# Patient Record
Sex: MALE | Race: WHITE | HISPANIC OR LATINO
[De-identification: names, ages, dates, MRNs, and addresses within clinical notes are randomized per-mention and may not be internally consistent; named-entity substitution may affect disease eponyms.]

---

## 2018-02-05 PROBLEM — Z00.00 ENCOUNTER FOR PREVENTIVE HEALTH EXAMINATION: Status: ACTIVE | Noted: 2018-02-05

## 2018-02-19 ENCOUNTER — NON-APPOINTMENT (OUTPATIENT)
Age: 43
End: 2018-02-19

## 2018-02-19 ENCOUNTER — APPOINTMENT (OUTPATIENT)
Dept: CARDIOLOGY | Facility: CLINIC | Age: 43
End: 2018-02-19
Payer: MEDICAID

## 2018-02-19 VITALS
DIASTOLIC BLOOD PRESSURE: 70 MMHG | HEIGHT: 66 IN | SYSTOLIC BLOOD PRESSURE: 112 MMHG | HEART RATE: 84 BPM | WEIGHT: 190 LBS | BODY MASS INDEX: 30.53 KG/M2

## 2018-02-19 DIAGNOSIS — Z80.9 FAMILY HISTORY OF MALIGNANT NEOPLASM, UNSPECIFIED: ICD-10-CM

## 2018-02-19 DIAGNOSIS — Z82.5 FAMILY HISTORY OF ASTHMA AND OTHER CHRONIC LOWER RESPIRATORY DISEASES: ICD-10-CM

## 2018-02-19 DIAGNOSIS — Z78.9 OTHER SPECIFIED HEALTH STATUS: ICD-10-CM

## 2018-02-19 PROCEDURE — 99205 OFFICE O/P NEW HI 60 MIN: CPT

## 2018-03-07 ENCOUNTER — APPOINTMENT (OUTPATIENT)
Dept: CARDIOLOGY | Facility: CLINIC | Age: 43
End: 2018-03-07
Payer: MEDICAID

## 2018-03-07 PROCEDURE — 93015 CV STRESS TEST SUPVJ I&R: CPT

## 2018-03-07 PROCEDURE — 93306 TTE W/DOPPLER COMPLETE: CPT

## 2018-03-14 ENCOUNTER — RECORD ABSTRACTING (OUTPATIENT)
Age: 43
End: 2018-03-14

## 2018-03-15 ENCOUNTER — APPOINTMENT (OUTPATIENT)
Dept: CARDIOLOGY | Facility: CLINIC | Age: 43
End: 2018-03-15
Payer: MEDICAID

## 2018-03-15 VITALS
HEIGHT: 66 IN | OXYGEN SATURATION: 98 % | RESPIRATION RATE: 16 BRPM | WEIGHT: 196 LBS | BODY MASS INDEX: 31.5 KG/M2 | HEART RATE: 80 BPM | SYSTOLIC BLOOD PRESSURE: 104 MMHG | DIASTOLIC BLOOD PRESSURE: 62 MMHG

## 2018-03-15 PROCEDURE — 99214 OFFICE O/P EST MOD 30 MIN: CPT

## 2018-06-06 ENCOUNTER — EMERGENCY (EMERGENCY)
Facility: HOSPITAL | Age: 43
LOS: 1 days | End: 2018-06-06
Payer: COMMERCIAL

## 2018-06-06 PROCEDURE — 99053 MED SERV 10PM-8AM 24 HR FAC: CPT

## 2018-06-06 PROCEDURE — 99284 EMERGENCY DEPT VISIT MOD MDM: CPT | Mod: 25

## 2018-09-20 ENCOUNTER — APPOINTMENT (OUTPATIENT)
Dept: CARDIOLOGY | Facility: CLINIC | Age: 43
End: 2018-09-20
Payer: MEDICAID

## 2018-09-20 VITALS
DIASTOLIC BLOOD PRESSURE: 60 MMHG | HEIGHT: 66 IN | SYSTOLIC BLOOD PRESSURE: 96 MMHG | BODY MASS INDEX: 30.05 KG/M2 | HEART RATE: 84 BPM | WEIGHT: 187 LBS

## 2018-09-20 PROCEDURE — 99213 OFFICE O/P EST LOW 20 MIN: CPT

## 2019-01-21 ENCOUNTER — APPOINTMENT (OUTPATIENT)
Dept: CARDIOLOGY | Facility: CLINIC | Age: 44
End: 2019-01-21

## 2019-03-15 ENCOUNTER — APPOINTMENT (OUTPATIENT)
Dept: CARDIOLOGY | Facility: CLINIC | Age: 44
End: 2019-03-15
Payer: MEDICAID

## 2019-03-15 ENCOUNTER — NON-APPOINTMENT (OUTPATIENT)
Age: 44
End: 2019-03-15

## 2019-03-15 VITALS
OXYGEN SATURATION: 97 % | SYSTOLIC BLOOD PRESSURE: 106 MMHG | DIASTOLIC BLOOD PRESSURE: 74 MMHG | BODY MASS INDEX: 29.05 KG/M2 | WEIGHT: 180 LBS | HEART RATE: 78 BPM

## 2019-03-15 PROCEDURE — 99214 OFFICE O/P EST MOD 30 MIN: CPT

## 2019-03-15 PROCEDURE — 93000 ELECTROCARDIOGRAM COMPLETE: CPT

## 2019-03-15 NOTE — ASSESSMENT
[FreeTextEntry1] : Past tests for reference:\par \par - EKG 02/19/18: Sinus rhythm. Small Q waves in inferior leads. Within normal limits\par - Echocardiogram March 2018: Normal LV function and wall motion. EF 60%.\par - Exercise stress test: March 2018: Exercise 9:57 minutes of [facility] protocol. Negative for ischemia by EKG. No chest pain or angina.

## 2019-03-15 NOTE — REVIEW OF SYSTEMS
[see HPI] : see HPI [Negative] : Heme/Lymph [Palpitations] : palpitations [Chest Pain] : no chest pain

## 2019-03-15 NOTE — DISCUSSION/SUMMARY
[FreeTextEntry1] : ELISSA BENSON is a 43 year old M who presents today Mar 15, 2019 with the above history and the following active issues: \par \par - Abnl EKG. No acute changes on initial EKG. Today's EKG remains unchanged. Echocardiogram and stress test were reviewed. Normal LVEF and no evidence of ischemia by EKG during stress testing. No recurrence of ACP. Active at baseline. Advised him to call our office for any change in symptoms. Otherwise he should focus on risk factor modification. Reviewed the need for low salt, low chol diet as well as weight reduction and regular cardiovascular activity. \par \par - We have requested the results of recent labs from PCP. Consider lipid panel and A1c for risk stratification. \par \par F/U with our office PRN.\par Reviewed red flag symptoms which would warrant urgent reevaluation.\par Any questions and concerns were addressed and resolved. \par \par Sincerely,\par \par MERRITT Serra\par Patients history, testing, and plan reviewed with supervising MD: Dr. Olga Britt

## 2019-03-15 NOTE — PHYSICAL EXAM
[General Appearance - Well Developed] : well developed [Normal Appearance] : normal appearance [Well Groomed] : well groomed [General Appearance - Well Nourished] : well nourished [No Deformities] : no deformities [General Appearance - In No Acute Distress] : no acute distress [Normal Conjunctiva] : the conjunctiva exhibited no abnormalities [Eyelids - No Xanthelasma] : the eyelids demonstrated no xanthelasmas [No Oral Pallor] : no oral pallor [No Oral Cyanosis] : no oral cyanosis [Respiration, Rhythm And Depth] : normal respiratory rhythm and effort [Exaggerated Use Of Accessory Muscles For Inspiration] : no accessory muscle use [Auscultation Breath Sounds / Voice Sounds] : lungs were clear to auscultation bilaterally [Heart Rate And Rhythm] : heart rate and rhythm were normal [Heart Sounds] : normal S1 and S2 [Murmurs] : no murmurs present [Abdomen Soft] : soft [Abdomen Tenderness] : non-tender [Abnormal Walk] : normal gait [Gait - Sufficient For Exercise Testing] : the gait was sufficient for exercise testing [Skin Color & Pigmentation] : normal skin color and pigmentation [Oriented To Time, Place, And Person] : oriented to person, place, and time [Affect] : the affect was normal [Mood] : the mood was normal [No Anxiety] : not feeling anxious [] : no ischemic changes [FreeTextEntry1] : No Carotid bruit. No JVD

## 2019-04-04 ENCOUNTER — EMERGENCY (EMERGENCY)
Facility: HOSPITAL | Age: 44
LOS: 1 days | End: 2019-04-04
Admitting: EMERGENCY MEDICINE
Payer: OTHER MISCELLANEOUS

## 2019-04-04 PROCEDURE — 73562 X-RAY EXAM OF KNEE 3: CPT | Mod: 26,LT

## 2019-04-04 PROCEDURE — 99283 EMERGENCY DEPT VISIT LOW MDM: CPT

## 2019-04-04 PROCEDURE — 73590 X-RAY EXAM OF LOWER LEG: CPT | Mod: 26,LT

## 2019-10-29 NOTE — HISTORY OF PRESENT ILLNESS
[FreeTextEntry1] : Joshua is a pleasant 43-year-old male with no significant past cardiovascular history. He does not have  hypertension or diabetes. He does not know his lipid profile. \par \par He was noted to have abnormal EKG and  patient had complains of left-sided chest discomfort during his initial evaluation about one year ago. He is fairly active. He works as a  or a . He does not have shortness of breath on exertion. He had echocardiogram and stress testing in March 2018 which were unremarkable.\par \par There is no prior history of MI, AF, CHF, or CVA. \par \par Since last seen there has been no illness or hospitalization. He remains active. No recurrence of chest pain or discomfort. He does note about 3 months ago he felt palpitations while he was working heavily. These resolved on their own and also have not recurred in months. Denies dizziness, lightheadedness or syncope. No orthopnea or edema. Stable weight.\par \par EKG today 3/15/19 ordered and interpreted by me reveals normal sinus rhythm with non-dx Qwaves inferiorly and early repolarization. There are no changes compared to prior. \par \par  no concerns

## 2020-03-19 ENCOUNTER — APPOINTMENT (OUTPATIENT)
Dept: CARDIOLOGY | Facility: CLINIC | Age: 45
End: 2020-03-19

## 2020-09-08 ENCOUNTER — EMERGENCY (EMERGENCY)
Facility: HOSPITAL | Age: 45
LOS: 1 days | End: 2020-09-08
Admitting: EMERGENCY MEDICINE
Payer: COMMERCIAL

## 2020-09-08 PROCEDURE — 99283 EMERGENCY DEPT VISIT LOW MDM: CPT

## 2021-04-29 ENCOUNTER — APPOINTMENT (OUTPATIENT)
Age: 46
End: 2021-04-29

## 2021-05-27 ENCOUNTER — APPOINTMENT (OUTPATIENT)
Age: 46
End: 2021-05-27

## 2021-11-18 ENCOUNTER — NON-APPOINTMENT (OUTPATIENT)
Age: 46
End: 2021-11-18

## 2021-11-18 ENCOUNTER — APPOINTMENT (OUTPATIENT)
Dept: CARDIOLOGY | Facility: CLINIC | Age: 46
End: 2021-11-18
Payer: MEDICAID

## 2021-11-18 VITALS
BODY MASS INDEX: 30.86 KG/M2 | HEIGHT: 66 IN | DIASTOLIC BLOOD PRESSURE: 78 MMHG | WEIGHT: 192 LBS | TEMPERATURE: 97.5 F | HEART RATE: 73 BPM | OXYGEN SATURATION: 98 % | SYSTOLIC BLOOD PRESSURE: 100 MMHG

## 2021-11-18 DIAGNOSIS — R00.2 PALPITATIONS: ICD-10-CM

## 2021-11-18 PROCEDURE — 93000 ELECTROCARDIOGRAM COMPLETE: CPT

## 2021-11-18 PROCEDURE — 99213 OFFICE O/P EST LOW 20 MIN: CPT

## 2021-11-18 NOTE — HISTORY OF PRESENT ILLNESS
[FreeTextEntry1] : Joshua is a pleasant 45-year-old male with no significant past cardiovascular history. He does not have  hypertension or diabetes. He does not know his lipid profile. \par \par He was noted to have abnormal EKG and  patient had history of left-sided chest discomfort during his initial evaluation about one year ago - which have resolved. He is fairly active. He works as a  or a . He does not have shortness of breath on exertion. He had echocardiogram and stress testing in March 2018 which were unremarkable.\par \par There is no prior history of MI, AF, CHF, or CVA.  Currently he does not take any medications.  He does not have hypertension\par \par Since last seen there has been no illness or hospitalization. He remains active. No recurrence of chest pain or discomfort. He does note about 3 months ago he felt palpitations while he was working heavily. These resolved on their own and also have not recurred in months. Denies dizziness, lightheadedness or syncope. No orthopnea or edema. Stable weight.\par \par

## 2021-11-18 NOTE — DISCUSSION/SUMMARY
[FreeTextEntry1] : ELISSA BENSON is a 45 year old M \par \par - Today's EKG is sinus rhythm, unremarkable.  Previous echocardiogram and stress test were reviewed. Normal LVEF and no evidence of ischemia by EKG during stress testing. No recurrence of ACP. Active at baseline. Advised him to call our office for any change in symptoms. Otherwise he should focus on risk factor modification. Reviewed the need for low salt, low chol diet as well as weight reduction and regular cardiovascular activity. \par \par We have requested the results of recent labs from PCP.  He was told that his lipids were acceptable\par \par F/U with our office in 1 year: Earlier if needed; Reviewed red flag symptoms which would warrant urgent reevaluation. Any questions and concerns were addressed and resolved. \par \par \par Thank you for this referral and allowing me to participate in the care of this patient.  If I can be of any further help or  if you have any questions, please do not hesitate to contact me\par \par \par Sincerely,\par \par Dereck Mccormick MD, FACC, MICHELLE

## 2021-11-18 NOTE — PHYSICAL EXAM
[General Appearance - Well Developed] : well developed [Normal Appearance] : normal appearance [Well Groomed] : well groomed [General Appearance - Well Nourished] : well nourished [No Deformities] : no deformities [General Appearance - In No Acute Distress] : no acute distress [Normal Conjunctiva] : the conjunctiva exhibited no abnormalities [Eyelids - No Xanthelasma] : the eyelids demonstrated no xanthelasmas [No Oral Pallor] : no oral pallor [No Oral Cyanosis] : no oral cyanosis [FreeTextEntry1] : No Carotid bruit. No JVD [Respiration, Rhythm And Depth] : normal respiratory rhythm and effort [Exaggerated Use Of Accessory Muscles For Inspiration] : no accessory muscle use [Auscultation Breath Sounds / Voice Sounds] : lungs were clear to auscultation bilaterally [Heart Sounds] : normal S1 and S2 [Heart Rate And Rhythm] : heart rate and rhythm were normal [Murmurs] : no murmurs present [Abdomen Soft] : soft [Abdomen Tenderness] : non-tender [Abnormal Walk] : normal gait [Gait - Sufficient For Exercise Testing] : the gait was sufficient for exercise testing [Skin Color & Pigmentation] : normal skin color and pigmentation [] : no rash [Oriented To Time, Place, And Person] : oriented to person, place, and time [Affect] : the affect was normal [Mood] : the mood was normal [No Anxiety] : not feeling anxious

## 2023-12-05 ENCOUNTER — APPOINTMENT (OUTPATIENT)
Dept: CARDIOLOGY | Facility: CLINIC | Age: 48
End: 2023-12-05
Payer: MEDICAID

## 2023-12-05 ENCOUNTER — NON-APPOINTMENT (OUTPATIENT)
Age: 48
End: 2023-12-05

## 2023-12-05 VITALS
OXYGEN SATURATION: 98 % | DIASTOLIC BLOOD PRESSURE: 74 MMHG | SYSTOLIC BLOOD PRESSURE: 112 MMHG | BODY MASS INDEX: 32.3 KG/M2 | RESPIRATION RATE: 14 BRPM | HEART RATE: 85 BPM | HEIGHT: 66 IN | WEIGHT: 201 LBS

## 2023-12-05 DIAGNOSIS — E78.00 PURE HYPERCHOLESTEROLEMIA, UNSPECIFIED: ICD-10-CM

## 2023-12-05 DIAGNOSIS — R94.31 ABNORMAL ELECTROCARDIOGRAM [ECG] [EKG]: ICD-10-CM

## 2023-12-05 DIAGNOSIS — R07.89 OTHER CHEST PAIN: ICD-10-CM

## 2023-12-05 DIAGNOSIS — E66.3 OVERWEIGHT: ICD-10-CM

## 2023-12-05 PROCEDURE — 99214 OFFICE O/P EST MOD 30 MIN: CPT | Mod: 25

## 2023-12-05 PROCEDURE — 93000 ELECTROCARDIOGRAM COMPLETE: CPT

## 2024-01-29 ENCOUNTER — APPOINTMENT (OUTPATIENT)
Dept: CARDIOLOGY | Facility: CLINIC | Age: 49
End: 2024-01-29

## 2024-12-09 ENCOUNTER — APPOINTMENT (OUTPATIENT)
Dept: CARDIOLOGY | Facility: CLINIC | Age: 49
End: 2024-12-09